# Patient Record
Sex: FEMALE | Race: BLACK OR AFRICAN AMERICAN | NOT HISPANIC OR LATINO | Employment: FULL TIME | URBAN - METROPOLITAN AREA
[De-identification: names, ages, dates, MRNs, and addresses within clinical notes are randomized per-mention and may not be internally consistent; named-entity substitution may affect disease eponyms.]

---

## 2024-03-11 ENCOUNTER — APPOINTMENT (OUTPATIENT)
Age: 49
End: 2024-03-11
Payer: COMMERCIAL

## 2024-03-11 ENCOUNTER — OCCMED (OUTPATIENT)
Age: 49
End: 2024-03-11
Payer: OTHER MISCELLANEOUS

## 2024-03-11 DIAGNOSIS — S49.91XA INJURY OF RIGHT SHOULDER, INITIAL ENCOUNTER: ICD-10-CM

## 2024-03-11 DIAGNOSIS — M25.511 ACUTE PAIN OF RIGHT SHOULDER: ICD-10-CM

## 2024-03-11 DIAGNOSIS — M25.511 ACUTE PAIN OF RIGHT SHOULDER: Primary | ICD-10-CM

## 2024-03-11 PROCEDURE — 99283 EMERGENCY DEPT VISIT LOW MDM: CPT | Performed by: PHYSICIAN ASSISTANT

## 2024-03-11 PROCEDURE — G0382 LEV 3 HOSP TYPE B ED VISIT: HCPCS | Performed by: PHYSICIAN ASSISTANT

## 2024-03-11 PROCEDURE — 73030 X-RAY EXAM OF SHOULDER: CPT

## 2024-06-19 ENCOUNTER — OFFICE VISIT (OUTPATIENT)
Age: 49
End: 2024-06-19
Payer: COMMERCIAL

## 2024-06-19 VITALS
HEART RATE: 88 BPM | BODY MASS INDEX: 24.85 KG/M2 | TEMPERATURE: 95 F | WEIGHT: 154.6 LBS | HEIGHT: 66 IN | DIASTOLIC BLOOD PRESSURE: 60 MMHG | OXYGEN SATURATION: 98 % | SYSTOLIC BLOOD PRESSURE: 120 MMHG

## 2024-06-19 DIAGNOSIS — Z11.4 ENCOUNTER FOR SCREENING FOR HIV: ICD-10-CM

## 2024-06-19 DIAGNOSIS — Z11.1 ENCOUNTER FOR PPD TEST: ICD-10-CM

## 2024-06-19 DIAGNOSIS — Z76.89 ENCOUNTER TO ESTABLISH CARE: Primary | ICD-10-CM

## 2024-06-19 DIAGNOSIS — Z11.1 ENCOUNTER FOR SCREENING FOR RESPIRATORY TUBERCULOSIS: ICD-10-CM

## 2024-06-19 DIAGNOSIS — Z13.6 ENCOUNTER FOR LIPID SCREENING FOR CARDIOVASCULAR DISEASE: ICD-10-CM

## 2024-06-19 DIAGNOSIS — Z13.220 ENCOUNTER FOR LIPID SCREENING FOR CARDIOVASCULAR DISEASE: ICD-10-CM

## 2024-06-19 DIAGNOSIS — Z12.11 SCREENING FOR COLON CANCER: ICD-10-CM

## 2024-06-19 DIAGNOSIS — L72.9 SKIN CYST: ICD-10-CM

## 2024-06-19 DIAGNOSIS — Z11.59 ENCOUNTER FOR HEPATITIS C SCREENING TEST FOR LOW RISK PATIENT: ICD-10-CM

## 2024-06-19 DIAGNOSIS — Z12.4 SCREENING FOR CERVICAL CANCER: ICD-10-CM

## 2024-06-19 DIAGNOSIS — Z12.31 ENCOUNTER FOR SCREENING MAMMOGRAM FOR MALIGNANT NEOPLASM OF BREAST: ICD-10-CM

## 2024-06-19 DIAGNOSIS — R53.83 OTHER FATIGUE: ICD-10-CM

## 2024-06-19 PROCEDURE — 86580 TB INTRADERMAL TEST: CPT

## 2024-06-19 PROCEDURE — 99386 PREV VISIT NEW AGE 40-64: CPT

## 2024-06-19 NOTE — PROGRESS NOTES
Adult Annual Physical  Name: Carina Meredith      : 1975      MRN: [id number]  Encounter Provider: Mick Reed PA-C  Encounter Date: 2024   Encounter department: [facility] [place]    Assessment & Plan   1. Encounter to establish care  Comments:  Here to establish  No new concerns  Needs TB two step testing  Not interested in any vaccines  2. Encounter for screening for respiratory tuberculosis  Comments:  Says employer requires two-step testing about 7 days after initial result.  Aware to make a follow-up and after reading sometime next week  Orders:  -     TB Skin Test  3. Skin cyst  Comments:  Appears fluid filled. transilluminates.   F/u if drainage desired.   Discussed recurrence and definitive tx excision  4. Other fatigue  -     Comprehensive metabolic panel; Future  -     CBC and differential; Future  -     TSH, 3rd generation with Free T4 reflex; Future  5. Encounter for lipid screening for cardiovascular disease  -     Lipid panel; Future  6. Encounter for screening for HIV  -     HIV 1/2 AG/AB w Reflex [facility] for 2 yr old and above; Future  7. Encounter for hepatitis C screening test for low risk patient  -     Hepatitis C antibody; Future  8. Encounter for screening mammogram for malignant neoplasm of breast  -     Mammo screening bilateral w 3d & cad; Future  9. Screening for colon cancer  -     Ambulatory Referral to Gastroenterology; Future  10. Screening for cervical cancer  Comments:  Has appt in [facility], referral cancelled  11. Encounter for PPD test    Immunizations and preventive care screenings were discussed with patient today. Appropriate education was printed on patient's after visit summary.    Counseling:  Alcohol/drug use: discussed moderation in alcohol intake, the recommendations for healthy alcohol use, and avoidance of illicit drug use.  Dental Health: discussed importance of regular tooth brushing, flossing, and dental  visits.  Injury prevention: discussed safety/seat belts, safety helmets, smoke detectors, carbon dioxide detectors, and smoking near bedding or upholstery.  Sexual health: discussed sexually transmitted diseases, partner selection, use of condoms, avoidance of unintended pregnancy, and contraceptive alternatives.  Exercise: the importance of regular exercise/physical activity was discussed. Recommend exercise 3-5 times per week for at least 30 minutes.       Depression Screening and Follow-up Plan: Patient was screened for depression during today's encounter. They screened negative with a PHQ-2 score of 0.        History of Present Illness     Adult Annual Physical:  Patient presents for annual physical. Here to establish   No new concerns   Needs TB two step testing   Not interested in any vaccines   Has a cyst over right bicep been there since 2008 no concerns with it, unchanged   .     Diet and Physical Activity:  - Diet/Nutrition: well balanced diet and portion control.  - Exercise: walking.    Depression Screening:  - PHQ-2 Score: 0    General Health:  - Sleep: sleeps well.  - Hearing: normal hearing right ear and normal hearing left ear.  - Vision: wears glasses.  - Dental: regular dental visits.    /GYN Health:  - Follows with GYN: yes.   - History of STDs: no    Advanced Care Planning:  - Has an advanced directive?: no    - Has a durable medical POA?: no    - ACP document given to patient?: no      Review of Systems   Constitutional:  Negative for activity change, diaphoresis, fatigue and fever.   HENT: Negative.  Negative for congestion and ear pain.    Eyes: Negative.    Respiratory: Negative.  Negative for cough, chest tightness and shortness of breath.    Cardiovascular:  Negative for chest pain, palpitations and leg swelling.   Gastrointestinal: Negative.    Endocrine: Negative for polydipsia, polyphagia and polyuria.   Genitourinary:  Negative for dysuria, flank pain, frequency, hematuria and  "urgency.   Musculoskeletal:  Negative for back pain, joint swelling, neck pain and neck stiffness.   Skin: Negative.    Neurological:  Negative for dizziness, weakness, light-headedness and headaches.   Hematological:  Negative for adenopathy.   Psychiatric/Behavioral:  Negative for confusion and sleep disturbance. The patient is not nervous/anxious.      Pertinent Medical History         Medical History Reviewed by provider this encounter:  Tobacco  Allergies  Meds  Problems  Med Hx  Surg Hx  Fam Hx       Past Medical History   Past Medical History:   Diagnosis Date    Anemia     low iron     History reviewed. No pertinent surgical history.  Family History   Problem Relation Age of Onset    Thyroid disease Mother     Cancer Paternal Aunt      No current outpatient medications on file prior to visit.     No current facility-administered medications on file prior to visit.   No Known Allergies   No current outpatient medications on file prior to visit.     No current facility-administered medications on file prior to visit.      Social History     Tobacco Use    Smoking status: Never    Smokeless tobacco: Never   Vaping Use    Vaping status: Never Used   Substance and Sexual Activity    Alcohol use: Not Currently    Drug use: Never    Sexual activity: Not on file       Objective     /60   Pulse 88   Temp (!) 95 °F (35 °C)   Ht 5' 6\" (1.676 m)   Wt 70.1 kg (154 lb 9.6 oz)   SpO2 98%   BMI 24.95 kg/m²     Physical Exam  Vitals and nursing note reviewed.   Constitutional:       General: She is not in acute distress.     Appearance: Normal appearance. She is normal weight. She is not ill-appearing, toxic-appearing or diaphoretic.   HENT:      Head: Normocephalic and atraumatic.      Right Ear: Tympanic membrane, ear canal and external ear normal. There is no impacted cerumen.      Left Ear: Tympanic membrane, ear canal and external ear normal. There is no impacted cerumen.      Nose: Nose normal. No " congestion or rhinorrhea.      Mouth/Throat:      Mouth: Mucous membranes are moist.      Pharynx: Oropharynx is clear. No oropharyngeal exudate.   Eyes:      General: No scleral icterus.        Right eye: No discharge.         Left eye: No discharge.      Extraocular Movements: Extraocular movements intact.      Conjunctiva/sclera: Conjunctivae normal.   Neck:      Vascular: No carotid bruit.   Cardiovascular:      Rate and Rhythm: Normal rate and regular rhythm.      Heart sounds: No murmur heard.  Pulmonary:      Effort: Pulmonary effort is normal.      Breath sounds: Normal breath sounds.   Abdominal:      General: Abdomen is flat. Bowel sounds are normal.      Palpations: There is no mass.      Tenderness: There is no abdominal tenderness. There is no rebound.   Musculoskeletal:      Cervical back: Normal range of motion and neck supple. No tenderness.   Lymphadenopathy:      Cervical: No cervical adenopathy.   Skin:     General: Skin is warm and dry.      Findings: No rash.             Comments: 2 cm cyst right arm. Transilluminates.   Neurological:      Mental Status: She is alert. Mental status is at baseline.      Sensory: No sensory deficit.      Motor: No weakness.   Psychiatric:         Mood and Affect: Mood normal.         Behavior: Behavior normal.         Thought Content: Thought content normal.         Judgment: Judgment normal.       Administrative Statements

## 2024-06-21 LAB
INDURATION: 0 MM
TB SKIN TEST: NEGATIVE

## 2024-06-26 ENCOUNTER — CLINICAL SUPPORT (OUTPATIENT)
Age: 49
End: 2024-06-26
Payer: COMMERCIAL

## 2024-06-26 ENCOUNTER — OFFICE VISIT (OUTPATIENT)
Dept: OBGYN CLINIC | Facility: CLINIC | Age: 49
End: 2024-06-26
Payer: COMMERCIAL

## 2024-06-26 VITALS
DIASTOLIC BLOOD PRESSURE: 68 MMHG | SYSTOLIC BLOOD PRESSURE: 114 MMHG | HEIGHT: 65 IN | WEIGHT: 154.6 LBS | BODY MASS INDEX: 25.76 KG/M2

## 2024-06-26 DIAGNOSIS — Z12.4 PAP SMEAR FOR CERVICAL CANCER SCREENING: ICD-10-CM

## 2024-06-26 DIAGNOSIS — Z01.419 ENCOUNTER FOR ANNUAL ROUTINE GYNECOLOGICAL EXAMINATION: Primary | ICD-10-CM

## 2024-06-26 DIAGNOSIS — Z11.1 SCREENING-PULMONARY TB: Primary | ICD-10-CM

## 2024-06-26 DIAGNOSIS — N85.2 ENLARGED UTERUS: ICD-10-CM

## 2024-06-26 PROCEDURE — G0476 HPV COMBO ASSAY CA SCREEN: HCPCS | Performed by: OBSTETRICS & GYNECOLOGY

## 2024-06-26 PROCEDURE — G0145 SCR C/V CYTO,THINLAYER,RESCR: HCPCS | Performed by: OBSTETRICS & GYNECOLOGY

## 2024-06-26 PROCEDURE — 86580 TB INTRADERMAL TEST: CPT

## 2024-06-26 PROCEDURE — S0610 ANNUAL GYNECOLOGICAL EXAMINA: HCPCS | Performed by: OBSTETRICS & GYNECOLOGY

## 2024-06-26 NOTE — PROGRESS NOTES
Patient new to our practice, presents for a routine annual visit  Menarche- 14Y/O  Last Pap Smear- no record. Collect today  LMP-24 approx  Birth control- none  Mammogram-ordered by pcp  Colonoscopy-referral placed by pcp    Non smoker  Social drinker  Currently not sexually active  No family history of uterine, ovarian, cervical or breast cancer    No concerns/questions for today's visit

## 2024-06-26 NOTE — PROGRESS NOTES
"Ambulatory Visit  Name: Carina Meredith      : 1975      MRN: [id number]  Encounter Provider: Erin Davidson MD  Encounter Date: 2024   Encounter department: [facility] [place]    Assessment & Plan   1. Encounter for annual routine gynecological examination  Assessment & Plan:  Pap/HPV collected  Mammogram ordered  Colonoscopy referral placed    Imaging for pelvis ordered, on exam uterus palpates to 14 week size and boggy.   2. Pap smear for cervical cancer screening  -     Liquid-based pap, screening  3. Enlarged uterus  -     US pelvis complete w transvaginal; Future      History of Present Illness     Carina Meredith is a 49 y.o. female who presents   Patient new to our practice, presents for a routine annual visit  Menarche- 16Y/O  Last Pap Smear- no record. Collect today  LMP-24 approx  Birth control- none  Mammogram-ordered by pcp  Colonoscopy-referral placed by pcp    Non smoker  Social drinker  Currently not sexually active  No family history of uterine, ovarian, cervical or breast cancer     No concerns/questions for today's visit    , not currently in relationship  Adult son lives with her  Menses regular heavy 3 days. Tolerable. Not hot flashes    Review of Systems   Constitutional:  Negative for activity change, appetite change, chills, fatigue and fever.   HENT:  Negative for rhinorrhea, sneezing and sore throat.    Eyes:  Negative for visual disturbance.   Respiratory:  Negative for cough, shortness of breath and wheezing.    Cardiovascular:  Negative for chest pain, palpitations and leg swelling.   Gastrointestinal:  Negative for abdominal distention, constipation, diarrhea, nausea and vomiting.   Genitourinary:  Negative for difficulty urinating.   Neurological:  Negative for syncope and light-headedness.       Objective     /68 (BP Location: Left arm, Patient Position: Sitting, Cuff Size: Standard)   Ht 5' 5\" (1.651 " m)   Wt 70.1 kg (154 lb 9.6 oz)   LMP 05/27/2024 (Approximate)   BMI 25.73 kg/m²     Physical Exam  Chest:   Breasts:     Breasts are symmetrical.      Right: No inverted nipple, mass, nipple discharge, skin change or tenderness.      Left: No inverted nipple, mass, nipple discharge, skin change or tenderness.   Genitourinary:     Labia:         Right: No rash, tenderness, lesion or injury.         Left: No rash, tenderness, lesion or injury.       Vagina: Normal. No vaginal discharge, erythema, tenderness or bleeding.      Cervix: No cervical motion tenderness, discharge, friability, erythema or cervical bleeding.      Uterus: Enlarged. Not deviated, not fixed and not tender.       Adnexa:         Right: No mass, tenderness or fullness.          Left: No mass, tenderness or fullness.     Neurological:      Mental Status: She is alert and oriented to person, place, and time.       Administrative Statements

## 2024-06-26 NOTE — ASSESSMENT & PLAN NOTE
Pap/HPV collected  Mammogram ordered  Colonoscopy referral placed    Imaging for pelvis ordered, on exam uterus palpates to 14 week size and boggy.

## 2024-06-27 LAB
HPV HR 12 DNA CVX QL NAA+PROBE: NEGATIVE
HPV16 DNA CVX QL NAA+PROBE: NEGATIVE
HPV18 DNA CVX QL NAA+PROBE: NEGATIVE

## 2024-06-28 LAB
INDURATION: 0 MM
TB SKIN TEST: NEGATIVE

## 2024-07-03 LAB
LAB AP GYN PRIMARY INTERPRETATION: NORMAL
Lab: NORMAL

## 2024-07-05 ENCOUNTER — TELEPHONE (OUTPATIENT)
Dept: OBGYN CLINIC | Facility: CLINIC | Age: 49
End: 2024-07-05

## 2024-07-05 NOTE — TELEPHONE ENCOUNTER
----- Message from Erin Davidson MD sent at 7/5/2024 10:57 AM EDT -----  Pap and HPV results have been reviewed. Normal.

## 2024-07-26 PROBLEM — Z01.419 ENCOUNTER FOR ANNUAL ROUTINE GYNECOLOGICAL EXAMINATION: Status: RESOLVED | Noted: 2024-06-26 | Resolved: 2024-07-26

## 2024-08-28 ENCOUNTER — OFFICE VISIT (OUTPATIENT)
Age: 49
End: 2024-08-28
Payer: COMMERCIAL

## 2024-08-28 VITALS
WEIGHT: 155 LBS | BODY MASS INDEX: 25.79 KG/M2 | OXYGEN SATURATION: 96 % | SYSTOLIC BLOOD PRESSURE: 106 MMHG | HEART RATE: 84 BPM | TEMPERATURE: 97.2 F | DIASTOLIC BLOOD PRESSURE: 54 MMHG | RESPIRATION RATE: 18 BRPM

## 2024-08-28 DIAGNOSIS — H18.822 CORNEAL ABRASION DUE TO CONTACT LENS, LEFT: Primary | ICD-10-CM

## 2024-08-28 PROCEDURE — G0383 LEV 4 HOSP TYPE B ED VISIT: HCPCS | Performed by: NURSE PRACTITIONER

## 2024-08-28 PROCEDURE — S9083 URGENT CARE CENTER GLOBAL: HCPCS | Performed by: NURSE PRACTITIONER

## 2024-08-28 RX ORDER — ERYTHROMYCIN 5 MG/G
0.5 OINTMENT OPHTHALMIC EVERY 12 HOURS SCHEDULED
Qty: 3.5 G | Refills: 0 | Status: SHIPPED | OUTPATIENT
Start: 2024-08-28

## 2024-08-28 RX ORDER — OFLOXACIN 3 MG/ML
1 SOLUTION/ DROPS OPHTHALMIC 4 TIMES DAILY
Qty: 5 ML | Refills: 0 | Status: SHIPPED | OUTPATIENT
Start: 2024-08-28

## 2024-08-28 NOTE — PROGRESS NOTES
[facility]        NAME: Carina Meredith is a 49 y.o. female  : 1975    MRN: [id number]  DATE: 2024  TIME: 4:36 PM    Assessment and Plan   Corneal abrasion due to contact lens, left [H18.822]  1. Corneal abrasion due to contact lens, left  Ambulatory Referral to Ophthalmology    erythromycin (ILOTYCIN) ophthalmic ointment    ofloxacin (OCUFLOX) 0.3 % ophthalmic solution        Ofloxain during the day, erythromycin ointment HS  Follow up optometry. Do not wear contacts until cleared after eye exam.       Patient Instructions       Follow up with PCP in 3-5 days.  Proceed to  ER if symptoms worsen.    If tests are performed, our office will contact you with results only if changes need to made to the care plan discussed with you at the visit. [facility]    Chief Complaint     Chief Complaint   Patient presents with   • Eye Problem     Pt states she put contacts in her eye that cause left eye swelling this morning         History of Present Illness       Pt states she put contacts in her eye that cause left eye swelling this morning)    Eye Problem   The left eye is affected. This is a new problem. The current episode started today. The problem has been gradually worsening. The injury mechanism was contact lenses. The pain is severe. There is No known exposure to pink eye. She Wears contacts. Associated symptoms include blurred vision, an eye discharge, double vision, eye redness, a foreign body sensation and photophobia. Pertinent negatives include no fever, itching, nausea, recent URI or vomiting. She has tried nothing for the symptoms. The treatment provided no relief.       Review of Systems   Review of Systems   Constitutional:  Negative for chills and fever.   Eyes:  Positive for blurred vision, double vision, photophobia, discharge and redness. Negative for itching.   Gastrointestinal:  Negative for nausea and vomiting.   Neurological:   Negative for dizziness and headaches.   Psychiatric/Behavioral:  The patient is nervous/anxious.          Current Medications       Current Outpatient Medications:   •  erythromycin (ILOTYCIN) ophthalmic ointment, Administer 0.5 inches into the left eye every 12 (twelve) hours, Disp: 3.5 g, Rfl: 0  •  Multiple Vitamin (MULTI VITAMIN DAILY PO), Take 1 tablet by mouth daily, Disp: , Rfl:   •  ofloxacin (OCUFLOX) 0.3 % ophthalmic solution, Administer 1 drop into the left eye 4 (four) times a day, Disp: 5 mL, Rfl: 0    Current Allergies     Allergies as of 08/28/2024   • (No Known Allergies)            The following portions of the patient's history were reviewed and updated as appropriate: allergies, current medications, past family history, past medical history, past social history, past surgical history and problem list.     Past Medical History:   Diagnosis Date   • Anemia     low iron       History reviewed. No pertinent surgical history.    Family History   Problem Relation Age of Onset   • Thyroid disease Mother    • Cancer Paternal Aunt    • Breast cancer Neg Hx    • Ovarian cancer Neg Hx          Medications have been verified.        Objective   /54   Pulse 84   Temp (!) 97.2 °F (36.2 °C)   Resp 18   Wt 70.3 kg (155 lb)   SpO2 96%   BMI 25.79 kg/m²        Physical Exam     Physical Exam  Vitals reviewed.   Constitutional:       General: She is not in acute distress.     Appearance: Normal appearance. She is not ill-appearing.   HENT:      Head: Normocephalic and atraumatic.   Eyes:      General:         Left eye: Foreign body and discharge present.     Conjunctiva/sclera:      Left eye: Left conjunctiva is injected.     Cardiovascular:      Rate and Rhythm: Normal rate and regular rhythm.      Heart sounds: Normal heart sounds, S1 normal and S2 normal.   Pulmonary:      Effort: Pulmonary effort is normal. No accessory muscle usage.      Breath sounds: Normal breath sounds. No wheezing.   Skin:      "General: Skin is warm and dry.      Capillary Refill: Capillary refill takes less than 2 seconds.      Findings: No rash.   Neurological:      General: No focal deficit present.      Mental Status: She is alert and oriented to person, place, and time.      Motor: Motor function is intact.   Psychiatric:         Attention and Perception: Attention and perception normal.         Mood and Affect: Mood and affect normal.         Speech: Speech normal.         Behavior: Behavior normal. Behavior is cooperative.         Thought Content: Thought content normal.         Universal Protocol:  Procedure performed by: (Dr. Phillips)  Consent: Verbal consent obtained.  Risks and benefits: risks, benefits and alternatives were discussed  Time out: Immediately prior to procedure a \"time out\" was called to verify the correct patient, procedure, equipment, support staff and site/side marked as required.  Timeout called at: 8/28/2024 4:18 PM.  Patient understanding: patient states understanding of the procedure being performed  Patient consent: the patient's understanding of the procedure matches consent given  Patient identity confirmed: verbally with patient and provided demographic data  Foreign body removal    Date/Time: 8/28/2024 4:18 PM    Performed by: KARLA Gray  Authorized by: KARLA Gray  Body area: eye  Location details: left conjunctiva    Anesthesia:  Local Anesthetic: tetracaine drops    Sedation:  Patient sedated: no  Patient restrained: no  Patient cooperative: yes  Localization method: eyelid eversion and visualized  Removal mechanism: irrigation, eyelid eversion and moist cotton swab  Eye examined with fluorescein.  Fluorescein uptake.  Corneal abrasion size: medium  Corneal abrasion location: medial, inferior and superior  Dressing: antibiotic drops and antibiotic ointment  Depth: superficial  Complexity: simple  Post-procedure assessment: foreign body removed  Patient tolerance: patient " tolerated the procedure well with no immediate complications

## 2024-08-28 NOTE — LETTER
August 28, 2024     Patient: Carina Meredith   YOB: 1975   Date of Visit: 8/28/2024       To Whom it May Concern:    Carina Meredith was seen in my clinic on 8/28/2024. She may return to work on 08/31/2024 .    If you have any questions or concerns, please don't hesitate to call.         Sincerely,          KARLA Gray        CC: No Recipients

## 2025-05-19 ENCOUNTER — TELEPHONE (OUTPATIENT)
Age: 50
End: 2025-05-19

## 2025-05-19 DIAGNOSIS — Z11.1 VISIT FOR TB SKIN TEST: Primary | ICD-10-CM

## 2025-05-21 ENCOUNTER — CLINICAL SUPPORT (OUTPATIENT)
Age: 50
End: 2025-05-21
Payer: COMMERCIAL

## 2025-05-21 DIAGNOSIS — Z11.1 VISIT FOR TB SKIN TEST: Primary | ICD-10-CM

## 2025-05-21 PROCEDURE — 86580 TB INTRADERMAL TEST: CPT

## 2025-05-21 NOTE — PROGRESS NOTES
Patient came in for PPD test today and did not have any paperwork with her. Gave patient vaccine document consent to fill. Form is in brown folder at Nurse station 1.

## 2025-05-23 LAB
INDURATION: 0 MM
TB SKIN TEST: NEGATIVE

## 2025-05-30 ENCOUNTER — CLINICAL SUPPORT (OUTPATIENT)
Age: 50
End: 2025-05-30
Payer: COMMERCIAL

## 2025-05-30 DIAGNOSIS — Z11.1 ENCOUNTER FOR PPD TEST: Primary | ICD-10-CM

## 2025-05-30 PROCEDURE — 86580 TB INTRADERMAL TEST: CPT

## 2025-06-02 ENCOUNTER — CLINICAL SUPPORT (OUTPATIENT)
Age: 50
End: 2025-06-02

## 2025-06-02 DIAGNOSIS — Z11.1 PPD SCREENING TEST: Primary | ICD-10-CM

## 2025-06-02 LAB
INDURATION: 0 MM
TB SKIN TEST: NEGATIVE

## 2025-06-02 PROCEDURE — NURSE

## 2025-06-25 ENCOUNTER — OFFICE VISIT (OUTPATIENT)
Age: 50
End: 2025-06-25
Payer: COMMERCIAL

## 2025-06-25 ENCOUNTER — APPOINTMENT (OUTPATIENT)
Age: 50
End: 2025-06-25
Payer: COMMERCIAL

## 2025-06-25 VITALS
HEIGHT: 65 IN | WEIGHT: 164.8 LBS | OXYGEN SATURATION: 94 % | TEMPERATURE: 97.4 F | HEART RATE: 75 BPM | BODY MASS INDEX: 27.46 KG/M2 | SYSTOLIC BLOOD PRESSURE: 110 MMHG | DIASTOLIC BLOOD PRESSURE: 70 MMHG

## 2025-06-25 DIAGNOSIS — Z00.00 HEALTHCARE MAINTENANCE: Primary | ICD-10-CM

## 2025-06-25 DIAGNOSIS — Z11.59 ENCOUNTER FOR HEPATITIS C SCREENING TEST FOR LOW RISK PATIENT: ICD-10-CM

## 2025-06-25 DIAGNOSIS — Z13.220 ENCOUNTER FOR SCREENING FOR LIPID DISORDER: ICD-10-CM

## 2025-06-25 DIAGNOSIS — Z12.31 ENCOUNTER FOR SCREENING MAMMOGRAM FOR BREAST CANCER: ICD-10-CM

## 2025-06-25 DIAGNOSIS — Z12.11 SCREEN FOR COLON CANCER: ICD-10-CM

## 2025-06-25 DIAGNOSIS — Z00.00 HEALTHCARE MAINTENANCE: ICD-10-CM

## 2025-06-25 DIAGNOSIS — Z11.4 SCREENING FOR HIV (HUMAN IMMUNODEFICIENCY VIRUS): ICD-10-CM

## 2025-06-25 LAB
ALBUMIN SERPL BCG-MCNC: 3.9 G/DL (ref 3.5–5)
ALP SERPL-CCNC: 57 U/L (ref 34–104)
ALT SERPL W P-5'-P-CCNC: 19 U/L (ref 7–52)
ANION GAP SERPL CALCULATED.3IONS-SCNC: 6 MMOL/L (ref 4–13)
AST SERPL W P-5'-P-CCNC: 20 U/L (ref 13–39)
BILIRUB SERPL-MCNC: 0.35 MG/DL (ref 0.2–1)
BUN SERPL-MCNC: 10 MG/DL (ref 5–25)
CALCIUM SERPL-MCNC: 8.6 MG/DL (ref 8.4–10.2)
CHLORIDE SERPL-SCNC: 105 MMOL/L (ref 96–108)
CHOLEST SERPL-MCNC: 129 MG/DL (ref ?–200)
CO2 SERPL-SCNC: 30 MMOL/L (ref 21–32)
CREAT SERPL-MCNC: 0.63 MG/DL (ref 0.6–1.3)
ERYTHROCYTE [DISTWIDTH] IN BLOOD BY AUTOMATED COUNT: 14.6 % (ref 11.6–15.1)
GFR SERPL CREATININE-BSD FRML MDRD: 105 ML/MIN/1.73SQ M
GLUCOSE P FAST SERPL-MCNC: 92 MG/DL (ref 65–99)
HCT VFR BLD AUTO: 41.1 % (ref 34.8–46.1)
HDLC SERPL-MCNC: 59 MG/DL
HGB BLD-MCNC: 12.9 G/DL (ref 11.5–15.4)
LDLC SERPL CALC-MCNC: 57 MG/DL (ref 0–100)
MCH RBC QN AUTO: 27.5 PG (ref 26.8–34.3)
MCHC RBC AUTO-ENTMCNC: 31.4 G/DL (ref 31.4–37.4)
MCV RBC AUTO: 88 FL (ref 82–98)
NONHDLC SERPL-MCNC: 70 MG/DL
PLATELET # BLD AUTO: 258 THOUSANDS/UL (ref 149–390)
PMV BLD AUTO: 10.6 FL (ref 8.9–12.7)
POTASSIUM SERPL-SCNC: 4 MMOL/L (ref 3.5–5.3)
PROT SERPL-MCNC: 7 G/DL (ref 6.4–8.4)
RBC # BLD AUTO: 4.69 MILLION/UL (ref 3.81–5.12)
SODIUM SERPL-SCNC: 141 MMOL/L (ref 135–147)
TRIGL SERPL-MCNC: 64 MG/DL (ref ?–150)
WBC # BLD AUTO: 5.41 THOUSAND/UL (ref 4.31–10.16)

## 2025-06-25 PROCEDURE — 80053 COMPREHEN METABOLIC PANEL: CPT

## 2025-06-25 PROCEDURE — 85027 COMPLETE CBC AUTOMATED: CPT

## 2025-06-25 PROCEDURE — 87389 HIV-1 AG W/HIV-1&-2 AB AG IA: CPT

## 2025-06-25 PROCEDURE — 80061 LIPID PANEL: CPT

## 2025-06-25 PROCEDURE — 99396 PREV VISIT EST AGE 40-64: CPT

## 2025-06-25 PROCEDURE — 86803 HEPATITIS C AB TEST: CPT

## 2025-06-25 PROCEDURE — 36415 COLL VENOUS BLD VENIPUNCTURE: CPT

## 2025-06-25 NOTE — PROGRESS NOTES
Adult Annual Physical  Name: Carina Meredith      : 1975      MRN: [id number]  Encounter Provider: Mick Reed PA-C  Encounter Date: 2025   Encounter department: [facility]    :  Assessment & Plan  Healthcare maintenance  Health maintenance reviewed   Orders:    Comprehensive metabolic panel; Future    CBC and Platelet; Future    Encounter for screening for lipid disorder    Orders:    Lipid panel; Future    Encounter for hepatitis C screening test for low risk patient    Orders:    Hepatitis C antibody; Future    Screening for HIV (human immunodeficiency virus)    Orders:    HIV 1/2 AG/AB w Reflex [facility] for 2 yr old and above; Future    Encounter for screening mammogram for breast cancer  Discussed importance of screening, is interested in testing, is going to reach out to insurance to see what the coinsurance will be  Orders:    Mammo screening bilateral w 3d and cad; Future    Screen for colon cancer  Discussed importance of screening, is interested in colonoscopy, is going to reach out to insurance to see what the coinsurance will be  Orders:    Ambulatory Referral to Gastroenterology; Future        Preventive Screenings:    - Cervical cancer screening: screening up-to-date     Immunizations:  - Immunizations due: Tdap      Depression Screening and Follow-up Plan: Patient was screened for depression during today's encounter. They screened negative with a PHQ-2 score of 0.          History of Present Illness     Adult Annual Physical:  Patient presents for annual physical. Patient presents today for annual physical, no new symptoms or concerns.  Doing well.  Would like blood work done.   Is going to be due for annual exam with OB/GYN, plans to call office to schedule appointment.     Depression Screening:  - PHQ-2 Score: 0    Review of Systems   Constitutional:  Negative for activity change, diaphoresis, fatigue and fever.   HENT: Negative.     Eyes: Negative.   "  Respiratory: Negative.  Negative for cough, chest tightness and shortness of breath.    Cardiovascular:  Negative for chest pain, palpitations and leg swelling.   Gastrointestinal: Negative.    Endocrine: Negative.  Negative for polydipsia, polyphagia and polyuria.   Genitourinary: Negative.  Negative for dysuria, flank pain, frequency, hematuria and urgency.   Musculoskeletal: Negative.  Negative for back pain, joint swelling, neck pain and neck stiffness.   Skin: Negative.    Neurological: Negative.  Negative for dizziness, weakness, light-headedness and headaches.   Hematological:  Negative for adenopathy.   Psychiatric/Behavioral: Negative.  Negative for confusion and sleep disturbance. The patient is not nervous/anxious.      Medical History Reviewed by provider this encounter:     .  Past Medical History   Past Medical History[1]  Past Surgical History[2]  Family History[3]   reports that she has never smoked. She has never used smokeless tobacco. She reports that she does not currently use alcohol. She reports that she does not use drugs.  Current Outpatient Medications   Medication Instructions    erythromycin (ILOTYCIN) ophthalmic ointment 0.5 inches, Left Eye, Every 12 hours scheduled    Multiple Vitamin (MULTI VITAMIN DAILY PO) 1 tablet, Daily    ofloxacin (OCUFLOX) 0.3 % ophthalmic solution 1 drop, Left Eye, 4 times daily   Allergies[4]   Medications Ordered Prior to Encounter[5]   Social History[6]    Objective   /70 (Patient Position: Sitting, Cuff Size: Standard)   Pulse 75   Temp (!) 97.4 °F (36.3 °C) (Tympanic)   Ht 5' 5\" (1.651 m)   Wt 74.8 kg (164 lb 12.8 oz)   LMP 06/03/2025 (Approximate)   SpO2 94%   BMI 27.42 kg/m²     Physical Exam  Vitals and nursing note reviewed.   Constitutional:       General: She is not in acute distress.     Appearance: She is normal weight. She is not ill-appearing, toxic-appearing or diaphoretic.   HENT:      Head: Normocephalic and atraumatic.      " "Right Ear: Tympanic membrane and external ear normal.      Left Ear: Tympanic membrane and external ear normal.      Nose: Nose normal.     Eyes:      General: No scleral icterus.        Right eye: No discharge.         Left eye: No discharge.      Extraocular Movements: Extraocular movements intact.      Conjunctiva/sclera: Conjunctivae normal.     Neck:      Vascular: No carotid bruit.     Cardiovascular:      Rate and Rhythm: Normal rate and regular rhythm.      Heart sounds: No murmur heard.  Pulmonary:      Effort: Pulmonary effort is normal. No respiratory distress.      Breath sounds: Normal breath sounds. No wheezing or rales.     Musculoskeletal:      Cervical back: Normal range of motion and neck supple. No tenderness.      Right lower leg: No edema.      Left lower leg: No edema.   Lymphadenopathy:      Cervical: No cervical adenopathy.     Skin:     Findings: No rash.     Neurological:      Mental Status: She is alert. Mental status is at baseline.     Psychiatric:         Mood and Affect: Mood normal.         Behavior: Behavior normal.         Thought Content: Thought content normal.         Judgment: Judgment normal.       Portions of the record may have been created with voice recognition software. Occasional wrong word or \"sound a like\" substitutions may have occurred due to the inherent limitations of voice recognition software. Read the chart carefully and recognize, using context, where substitutions have occurred.           [1]   Past Medical History:  Diagnosis Date    Anemia     low iron   [2] No past surgical history on file.  [3]   Family History  Problem Relation Name Age of Onset    Thyroid disease Mother      Cancer Paternal Aunt      Breast cancer Neg Hx      Ovarian cancer Neg Hx     [4] No Known Allergies  [5]   Current Outpatient Medications on File Prior to Visit   Medication Sig Dispense Refill    Multiple Vitamin (MULTI VITAMIN DAILY PO) Take 1 tablet by mouth in the morning.      " erythromycin (ILOTYCIN) ophthalmic ointment Administer 0.5 inches into the left eye every 12 (twelve) hours (Patient not taking: Reported on 6/25/2025) 3.5 g 0    ofloxacin (OCUFLOX) 0.3 % ophthalmic solution Administer 1 drop into the left eye 4 (four) times a day (Patient not taking: Reported on 6/25/2025) 5 mL 0     No current facility-administered medications on file prior to visit.   [6]   Social History  Tobacco Use    Smoking status: Never    Smokeless tobacco: Never   Vaping Use    Vaping status: Never Used   Substance and Sexual Activity    Alcohol use: Not Currently    Drug use: Never    Sexual activity: Not Currently     Partners: Male

## 2025-06-26 LAB
HCV AB SER QL: NORMAL
HIV 1+2 AB+HIV1 P24 AG SERPL QL IA: NORMAL

## 2025-07-22 ENCOUNTER — TELEPHONE (OUTPATIENT)
Age: 50
End: 2025-07-22

## 2025-07-23 NOTE — TELEPHONE ENCOUNTER
Patient returned call.  Relayed provider's message.      Patient expressed understanding, but feels that information is not needed.  She states she does have record of MMR vaccines in the past, but she would need to locate them.    If needed, she will attach her records to the completed form.    Please call back to advise when form has been completed.